# Patient Record
Sex: FEMALE | NOT HISPANIC OR LATINO | URBAN - METROPOLITAN AREA
[De-identification: names, ages, dates, MRNs, and addresses within clinical notes are randomized per-mention and may not be internally consistent; named-entity substitution may affect disease eponyms.]

---

## 2021-11-24 ENCOUNTER — TELEPHONE (OUTPATIENT)
Dept: TRANSPLANT | Facility: CLINIC | Age: 41
End: 2021-11-24

## 2021-11-24 NOTE — TELEPHONE ENCOUNTER
"DL Number: [id number] DL State: [place]  Mother's [place] Name: Sabi  Voucher: Wants More Info Registered As: Standard Voucher Donor  Donor Intake Start: 21 Donor Intake Complete: 21  Gender: Female Preferred Language: English  Full Name: Claribel Bliss Is  Needed: [not answered]  E-mail: [email or web address] Phone Number: [phone]  Secondary Phone:  Contact Preference: [not answered] Best Contact Time: 1pm - 4pm  Emergency Contact: Chong Tellez Emergency Contact #: [phone]  Relationship to Contact: Contact is my spouse  : 82 Age: 41  Address: [address] [place] [address] City: [place]  State: [place] Postal Code: [id number]  Height: 5'4\" Weight: 183lbs  BMI: 31.4  Employment Status: Employed Has PTO for donation? Yes, using vacation  Occupation:  Requires Heavy Lifting? Yes  Education Level: Four Year Degree Marital Status:   Exercise Routine: Occasional Health Insurance: Yes  Blood Type: O Ethnicity/Race: White  Donor Type: Standard Voucher Donor  Prefer Remote Donation: [not answered]  Physician: Don't have a primary  doctor<br/>N/A, [place]  Donating for Local Recipient  Recipient's Name: Geremias Eden Recipient's : 02  Recipient's Status: Patient not on dialysis but  needs a transplant soon.  How Candidate Knows Recip: Other  Candidate communicates w/  Recip: Several Times Per Month  Possible Interest In:  Motivation to donate: My nephew needs a new kidney and I want him to live a long and healthy life.  Living Donor Pre-Screening  [place] In [place]  Will accept blood transfusions? Yes  Has been diagnosed with kidney disease? No  Has had a heart attack? No  Has Diabetes (High BGs)? No  Has had cancer? No  Has had kidney stones? No  Has ever been pregnant? No  Is Planning on Pregnancy? Yes  Is Taking Birth Control? No  Has Used Tobacco? No  Has HIV? No  Is Currently Incarcerated? No  Is Currently Residing in [place] Yes  History Misc  Has Allergies? " Yes  Allergy  seasonal allergies  Has had Surgeries? No  Takes Medication? Yes  Medication Dose Frequency  Loratadine 10mg 1  Multivitamin 2 1  Zinc 50 mg 1  Iron 65mg 1  Advil 2 as needed  Cranberry with Vitamin C 168 mg/40 mg 1  Krill Oil 1200 mg 1  Medical History  History of High BP? Never  History of CABG (bypass surgery)? No  History of blood clots? Never  History of coronary disease? Never  History of high cholesterol or triglycerides? Unknown  Has stents implanted? No  History of chest pain during exercise? No  History of chest pain at other times? No  Results of climbing 2 flights of stairs? Shortness Of Breath  Had stress test in last year? No  Has had stroke? No  Has had leg bypass? No  History of lung disease? Never  History of COPD? Never  History of TB? Never  History of Pneumonia? Treated in past  Has respiratory issues? No  Has HIV? No  Has Gastro Issues? Yes   - gastro issues? GERD, diarrhea when I do not  eat well.  History of Gallstones? Treated in past  History of Pancreatitis? Never  History of Liver Disease? Never  History of Hepatitis B? Never  History of Hepatitis C? Never  History of bleeding problems? Never  History of UTIs? Yes   - UTI episodes: 4   - years since last UTI: 13 years  History of kidney damage? Never  History of Proteinuria? Unknown  History of Hematuria? Never  History of neuro disease? Never  History of seizure? Never  History of lupus? Never  History of paralysis? Never  History of arthritis? Never  History of neuropathy? Never  History of depression? Never  History of anxiety? Unknown  History of documented psychiatric illness? Never  History of Fibroid Uterus? Never  History of Endometriosis? Never  History of Polycystic Ovaries? Treated in past  Has had Miscarriages? No  Has had abortions? No  Has had transfusions? No  History of Obesity? Yes  History of Fabry's Disease? No  History of Sickle Cell Disease? No  History of Sickle Cell Trait? No  History of Sarcoidosis?  No  Has auto-immune disease? No  Has had Physical Exam? Yes   - how many years ago: 10  Has had Mammogram? No  Has had Pap Smear? Yes   - how many years ago: 1  Colonoscopy? No  Medical history comments?  Last Pap Smear: 2021 Anxiety: Never been diagnosed or treated, but definitely have. UTI's:  had a few in my late teens and early twenties - haven't had one for many years Ovarian Cysts: I  haven't been diagnosed with PCOS, but have had ovarian cysts. GERD/Gallbladder Issues: I  have had issues with GERD and frequently get diarrhea if I do not eat well. I do not have  children and my  and I have not ruled this out. We may start trying to get pregnant in  the near future,  Living Donor Family Medical History  Anyone with kidney disease? No  Anyone with liver disease? No  Anyone with heart disease? Yes   - which family members:  half-sister, mother, maternal  grandfather, maternal  grandmother, paternal  grandfather  Anyone with coronary artery disease? Yes   - which family members: maternal grandmother?  Anyone with high blood pressure? Yes   - which family members: mother, maternal grandmother,  father  Anyone with blood disorder? No  Anyone with cancer? Yes   - which family members: paternal grandmother  ()  Anyone with kidney cancer? No  Anyone with diabetes? No  Is mother alive? Yes  Mother's age? 64  Is father alive? Yes  Father's age? 66  How many siblings? 6  How many adult children? 0  How many children under 18? 0  Social History  Has Used Alcohol? Yes   - currently uses alcohol: Yes   - how much: 4/Monthly  Has Abused Alcohol? No  Has Used Drugs? Yes  Drugs Used Last Used Got Treatment?  Marijuana Current (less than 1yr) No  Has had legal issues w/ law enforcement? No  Traveled over 100 miles from home in last year? Yes   - Traveled Where? [place], [place], [place]  Has had suicidal thoughts or attempts in the last five years? No

## 2021-11-30 ENCOUNTER — TELEPHONE (OUTPATIENT)
Dept: TRANSPLANT | Facility: CLINIC | Age: 41
End: 2021-11-30

## 2021-11-30 NOTE — TELEPHONE ENCOUNTER
Initial Independent Living Donor Advocate contact made with potential donor today.  I introduced myself and my role during the donation process, includin.  ANTONI ROLE   The federal government requires that all licensed transplant centers provide the living donor with an Independent Living Donor Advocate (ANTONI).  I do not meet recipients or attend meetings that discuss their care or decision to transplant them. My role is separate to avoid any conflict of interest.  My role is to ensure:  1) your rights are protected;  2) you get all the information you need from the transplant team to make a fully informed decision whether to donate;   3) that living donation is in your best interest.   4) that you have the right to decide NOT to go forward with living donation at any time during this process.  I am available to you throughout the workup, during surgery phase and follow-up at home.   2. WORKUP & PRIVACY     Your identity and workup are not shared with the recipient at any time.     There is a medical donor workup that consists of testing to determine if you are healthy enough to donate.  Workup tests include many blood draws, urine collection/ (kidney function testing), chest x-ray, EKG, CT scan. As you complete each step then you may move on to the next.  Workup can take as little or as long as you need and you can stop the process at any time.     Transplant is a treatment option, not a cure. A kidney from a living kidney donor can last 12-14 years.  Other treatment options are  donation and two types of dialysis.     This is major surgery and your estimated hospital stay is approximately 1-2 nights.  After surgery, there are driving and lifting restrictions - no driving for two weeks and no lifting over ten pounds for 6 - 8 weeks.  Donors are routinely off from work for 4 - 6 weeks after surgery, and potentially longer if they have a physical job.       If you anticipate lost wages due to donation,  donor wage reimbursement options may be available to you and will be reviewed with you during the evaluation process.      The recipient's insurance covers the medical expenses related to the donor evaluation and surgery.  However, it is important for you to carry your own health insurance to address any medical issues that are found and are NOT related to living donation.  3.  QUESTIONS  Have you received a packet from the transplant department? yes    Questions?    Have you discussed with anyone your potential decision to donate?   {yes,   Is anyone pressuring or coercing you to donate? no  Have you discussed any financial arrangements with recipient around donating a kidney? no  Are you aware that you can confidentially opt out at any time, up to and including day of donation? yes  At this time, would you like to proceed with the medical evaluation to see if you can be a kidney donor? yes    If yes, the donor coordinator will be reaching out to you with next steps.     You can reach me or someone else on the ANTONI team by calling [phone] Option 3.    ANTONI NOTES: would like to move forward with donation evaluation.  Some reservations due to considering getting pregnant soon, but states she is highly motivated to continue this process, to see if she is able to be a donor for him.  Appt scheduled with Claribel Hopson for Monday, 12/6 at 3PM.    Duration of call 35 minutes

## 2021-12-06 NOTE — TELEPHONE ENCOUNTER
Contacted Claribel Bliss to introduce myself and my role, review of medical/surgical/family history and next steps.     Claribel Bliss  is aware She can stop donor evaluation at any time.    Have you ever been positive for COVID 19? No    Have you received the COVID vaccination? Yes- Pfizer     Claribel Bliss is a 41 year old female  ABO O that would like to learn more about kidney donation.     Concerns from medical/surgical/family history: None    Reviewed any history of travel in endemic areas: [place]  Strongyloides- [place], [place] and [place].  Trypanosoma cruzi ([place])- [place]  West Nile Virus- [place], [place], [place], [place] and [place].     Per our Phase 1 algorithm, does not meet criteria to do preliminary testing.    Reviewed preliminary lab tests.     Reviewed evaluation testing: Covid PCR, Iohexol, Lab work, CXR, EKG, Provider visits and functions, CT Angiogram.     Reviewed operations of selection committee and angio review meetings and the need for multidisciplinary input.     Reviewed [facility] listing and transfer of care to [facility] team if approved. Provided Claribel with [facility] website to review.     Briefly went over options if approved of NDD, SVP and FVP.        Confirmed that Claribel reviewed Informed consent document and all questions answered.  Reviewed that they will receive Docusign to obtain electronic signature for the following: Informed consent, SRTR data, MISTY for medical information, Auth for Electronic communication and will need their signed consent back before proceeding with evaluation.        Encouraged sign up for MyChart and confirmed [facility] sign up.     Verified recipient status if not NDD.    Donor timeline: Whenever is needed.     Claribel would like to proceed to evaluation, if needed. Discussed with Claribel that we would be in touch if we need her to come forward with evaluation.     Pt is okay being a back up  donor and knows we will be in touch if we need to bring her in for an evaluation.     Pt is possibly wanting to try to get pregnant, if possible, and understands if she would be an approved donor that the recommendation would be to wait a year until getting pregnant. Pt states she would put that on hold to help her nephew- however did tell patient I would send her the articles on pregnancy after kidney donation for her to review.     Pt had no further questions at this time and verbalized understanding of discussion.